# Patient Record
Sex: MALE | Race: WHITE | NOT HISPANIC OR LATINO
[De-identification: names, ages, dates, MRNs, and addresses within clinical notes are randomized per-mention and may not be internally consistent; named-entity substitution may affect disease eponyms.]

---

## 2018-03-28 ENCOUNTER — APPOINTMENT (OUTPATIENT)
Dept: ORTHOPEDIC SURGERY | Facility: CLINIC | Age: 83
End: 2018-03-28

## 2018-04-11 ENCOUNTER — APPOINTMENT (OUTPATIENT)
Dept: ORTHOPEDIC SURGERY | Facility: CLINIC | Age: 83
End: 2018-04-11

## 2021-09-10 ENCOUNTER — EMERGENCY (EMERGENCY)
Facility: HOSPITAL | Age: 86
LOS: 1 days | Discharge: HOME | End: 2021-09-10
Attending: EMERGENCY MEDICINE | Admitting: EMERGENCY MEDICINE
Payer: MEDICARE

## 2021-09-10 VITALS
RESPIRATION RATE: 18 BRPM | OXYGEN SATURATION: 97 % | SYSTOLIC BLOOD PRESSURE: 153 MMHG | DIASTOLIC BLOOD PRESSURE: 72 MMHG | TEMPERATURE: 97 F | HEART RATE: 89 BPM

## 2021-09-10 VITALS
HEART RATE: 80 BPM | DIASTOLIC BLOOD PRESSURE: 88 MMHG | TEMPERATURE: 98 F | SYSTOLIC BLOOD PRESSURE: 147 MMHG | RESPIRATION RATE: 16 BRPM

## 2021-09-10 LAB
APPEARANCE UR: CLEAR — SIGNIFICANT CHANGE UP
BILIRUB UR-MCNC: NEGATIVE — SIGNIFICANT CHANGE UP
COLOR SPEC: SIGNIFICANT CHANGE UP
DIFF PNL FLD: ABNORMAL
GLUCOSE UR QL: NEGATIVE — SIGNIFICANT CHANGE UP
KETONES UR-MCNC: NEGATIVE — SIGNIFICANT CHANGE UP
LEUKOCYTE ESTERASE UR-ACNC: NEGATIVE — SIGNIFICANT CHANGE UP
NITRITE UR-MCNC: NEGATIVE — SIGNIFICANT CHANGE UP
PH UR: 6.5 — SIGNIFICANT CHANGE UP (ref 5–8)
PROT UR-MCNC: ABNORMAL
SP GR SPEC: 1.01 — SIGNIFICANT CHANGE UP (ref 1–1.05)
UROBILINOGEN FLD QL: SIGNIFICANT CHANGE UP

## 2021-09-10 PROCEDURE — 99284 EMERGENCY DEPT VISIT MOD MDM: CPT

## 2021-09-10 NOTE — ED PROVIDER NOTE - PHYSICAL EXAMINATION
PHYSICAL EXAM:  GENERAL: Sitting comfortable in bed, in no acute distress  HENMT: Atraumatic, moist mucous membranes  EYES: Clear bilaterally, PERRL, EOMs intact b/l  HEART: RRR, S1/S2, no murmurs  RESPIRATORY: Clear to auscultation bilaterally, no wheezes/rhonchi/rales  ABDOMEN: suprapubic tenderness, +BS, soft, nondistended  EXTREMITIES: No lower extremity edema, +2 radial pulses b/l  NEURO:  A&Ox4, no focal motor deficits or sensory deficits   SKIN:  Skin normal color for race, warm, dry and intact. No evidence of rash.

## 2021-09-10 NOTE — ED PROVIDER NOTE - CLINICAL SUMMARY MEDICAL DECISION MAKING FREE TEXT BOX
87 y/o M PMH HTN, HLD, BPH s/p trasurethral prostate ablation procedure (Dr. Zuniga) yesterday at 10am presenting with urinary retention x5 hours. No infectious symptoms. Vitals stable. Will place pena, send UA/culture and reassess.

## 2021-09-10 NOTE — ED PROVIDER NOTE - OBJECTIVE STATEMENT
89 y/o M PMH HTN, HLD, BPH s/p trasurethral prostate ablation procedure (Dr. Zuniga) yesterday at 10am presenting with urinary retention. Pt states after the procedure he was able to urinate small amounts with some difficulty, however today urination became increasingly difficult to the point he has been unable to urinate for the past 5 hours and has developed significant suprapubic discomfort. He denies any fevers/chills, chest pain, SOB, n/v/d, lightheadedness/fainting or other complaints at this time. 87 y/o M PMH HTN, HLD, BPH s/p transurethral prostate ablation procedure (Dr. Zuniga) yesterday at 10am presenting with urinary retention. Pt states after the procedure he was able to urinate small amounts with some difficulty, however today urination became increasingly difficult to the point he has been unable to urinate for the past 5 hours and has developed significant suprapubic discomfort. He denies any fevers/chills, chest pain, SOB, n/v/d, lightheadedness/fainting or other complaints at this time.

## 2021-09-10 NOTE — ED ADULT NURSE NOTE - OBJECTIVE STATEMENT
Pt arrives to rm 12, appears very uncomfortable , restless. Pt st" I had a procedure yesterday ...a transurethral thermal therapy for enlarged prostate...I got home around 12noon.  and I am not able to urinate. ...they gave me cathers to try to self catherize I tried but nothing came out." Resident MD Saadia Olsen at bedside. Graves cath #16 placed without difficulty, clear yellow urine out. Ua c&s sent. Pt immediately expressing relief.

## 2021-09-10 NOTE — ED PROVIDER NOTE - NS ED ROS FT
Constitutional: no fever and no chills  Eyes: no discharge, no irritation, no pain, no visual changes  ENMT: no dysphagia or throat pain  Neck: no pain, no stiffness, no swollen glands  CV: no chest pain, no palpitations, no edema  Resp: no cough, no shortness of breath  Abd: (+) suprapubic pain, no nausea or vomiting, no diarrhea  : (+) urinary retention, no dysuria, (+) hematuria after procedure  MSK: no back pain, no neck pain, no joint pain  Neuro: no LOC, no gait abnormality, no headache, no sensory deficits, no weakness  Skin: no rashes, no lacerations, no lesions

## 2021-09-10 NOTE — ED PROVIDER NOTE - NSFOLLOWUPINSTRUCTIONS_ED_ALL_ED_FT
You were seen in the ER for urine retention after your prostate procedure. We placed a pena catheter here to empty your bladder which relieved your urine obstruction. We tested your urine which showed no sign of infection. You will be discharged home and should follow-up with your urologist Dr. Zuniga as soon as possible to discuss your ER visit and for further evaluation and management including eventual pena removal. Seek urgent medical attention or return to the ER for any new or worsening fevers/chills, abdominal pain, chest pain, difficulty breathing, vomiting/inability to eat, decreased urine output from your catheter, bleeding, or any other new or concerning symptoms. No

## 2021-09-10 NOTE — ED PROVIDER NOTE - PROGRESS NOTE DETAILS
Pretty EM/IM PGY4: Graves placed w/ 350cc urine output. UA negative for infection. Pt feels significantly better, suprapubic pressure resolved. Plan to d/c with close outpt f/u with urologist Dr. Zuniga and strict return precautions. Pt verbalized understanding of and agrees with these results and plan.

## 2021-09-10 NOTE — ED PROVIDER NOTE - CARE PROVIDER_API CALL
Shravan Zuniga  UROLOGY  [address] [place] [address]  Phone: [phone]  Fax: [phone]  Established Patient  Follow Up Time: 1-3 Days

## 2021-09-10 NOTE — ED PROVIDER NOTE - PATIENT PORTAL LINK FT
You can access the FollowMyHealth Patient Portal offered by [facility] by registering at the following website: [email or web address]. By joining [facility]’s FollowMyHealth portal, you will also be able to view your health information using other applications (apps) compatible with our system.

## 2021-09-10 NOTE — ED PROVIDER NOTE - ATTENDING CONTRIBUTION TO CARE
HPI: 87 y/o M PMH HTN, HLD, BPH s/p transurethral prostate ablation procedure (Dr. Zuniga) yesterday at 10am presenting with urinary retention. Pt states after the procedure he was able to urinate small amounts with some difficulty, however today urination became increasingly difficult to the point he has been unable to urinate for the past 5 hours and has developed significant suprapubic discomfort. He denies any fevers/chills, chest pain, SOB, n/v/d, lightheadedness/fainting or other complaints at this time.  EXAM: Uncomfortable appearing, abd soft, suprapubic tenderness to palpation with mass, no rebound/guarding,  otherwise normal without hernias or bleeding from urethra.   MDM: pt with BPH s/p TURP yday that presents with diff urinating now without ability to urinate since 5 hours prior to arrival. No fever, chills, N/V, or other systemic symptoms. likely secondary to procedure/spasms. Unlikely UTI but will place pena, send UA/culture and likely discharge home with leg bag and f/u with his URO. Pt amenable to plan.

## 2021-09-10 NOTE — ED ADULT NURSE NOTE - CHIEF COMPLAINT QUOTE
Pt had a Transurethral microwave therapy yesterday morning and c/o urinary rentention since. pt appears uncomfortable. Denies PMH

## 2021-09-11 LAB
CULTURE RESULTS: NO GROWTH — SIGNIFICANT CHANGE UP
SPECIMEN SOURCE: SIGNIFICANT CHANGE UP

## 2023-04-17 PROBLEM — I10 ESSENTIAL (PRIMARY) HYPERTENSION: Chronic | Status: ACTIVE | Noted: 2021-09-10

## 2023-04-17 PROBLEM — N40.0 BENIGN PROSTATIC HYPERPLASIA WITHOUT LOWER URINARY TRACT SYMPTOMS: Chronic | Status: ACTIVE | Noted: 2021-09-10

## 2023-04-17 PROBLEM — E78.5 HYPERLIPIDEMIA, UNSPECIFIED: Chronic | Status: ACTIVE | Noted: 2021-09-10

## 2023-05-05 ENCOUNTER — TRANSCRIPTION ENCOUNTER (OUTPATIENT)
Age: 88
End: 2023-05-05

## 2023-05-05 ENCOUNTER — APPOINTMENT (OUTPATIENT)
Dept: SURGICAL ONCOLOGY | Facility: CLINIC | Age: 88
End: 2023-05-05
Payer: MEDICARE

## 2023-05-05 VITALS
HEIGHT: 67 IN | HEART RATE: 75 BPM | WEIGHT: 175 LBS | SYSTOLIC BLOOD PRESSURE: 165 MMHG | OXYGEN SATURATION: 93 % | RESPIRATION RATE: 16 BRPM | BODY MASS INDEX: 27.47 KG/M2 | DIASTOLIC BLOOD PRESSURE: 77 MMHG

## 2023-05-05 PROCEDURE — 99205 OFFICE O/P NEW HI 60 MIN: CPT

## 2023-05-13 ENCOUNTER — RESULT REVIEW (OUTPATIENT)
Age: 88
End: 2023-05-13

## 2023-05-13 ENCOUNTER — OUTPATIENT (OUTPATIENT)
Dept: OUTPATIENT SERVICES | Facility: HOSPITAL | Age: 88
LOS: 1 days | End: 2023-05-13
Payer: MEDICARE

## 2023-05-13 DIAGNOSIS — D03.59 MELANOMA IN SITU OF OTHER PART OF TRUNK: ICD-10-CM

## 2023-05-13 PROCEDURE — 88321 CONSLTJ&REPRT SLD PREP ELSWR: CPT

## 2023-05-16 LAB — SURGICAL PATHOLOGY STUDY: SIGNIFICANT CHANGE UP

## 2023-05-19 ENCOUNTER — OUTPATIENT (OUTPATIENT)
Dept: OUTPATIENT SERVICES | Facility: HOSPITAL | Age: 88
LOS: 1 days | End: 2023-05-19
Payer: MEDICARE

## 2023-05-19 VITALS
OXYGEN SATURATION: 95 % | WEIGHT: 175.05 LBS | SYSTOLIC BLOOD PRESSURE: 150 MMHG | HEART RATE: 67 BPM | TEMPERATURE: 98 F | HEIGHT: 64.5 IN | RESPIRATION RATE: 16 BRPM | DIASTOLIC BLOOD PRESSURE: 60 MMHG

## 2023-05-19 DIAGNOSIS — D03.59 MELANOMA IN SITU OF OTHER PART OF TRUNK: ICD-10-CM

## 2023-05-19 DIAGNOSIS — I10 ESSENTIAL (PRIMARY) HYPERTENSION: ICD-10-CM

## 2023-05-19 DIAGNOSIS — E78.5 HYPERLIPIDEMIA, UNSPECIFIED: ICD-10-CM

## 2023-05-19 DIAGNOSIS — N40.0 BENIGN PROSTATIC HYPERPLASIA WITHOUT LOWER URINARY TRACT SYMPTOMS: ICD-10-CM

## 2023-05-19 LAB
ALBUMIN SERPL ELPH-MCNC: 4.5 G/DL — SIGNIFICANT CHANGE UP (ref 3.3–5)
ALP SERPL-CCNC: 67 U/L — SIGNIFICANT CHANGE UP (ref 40–120)
ALT FLD-CCNC: 23 U/L — SIGNIFICANT CHANGE UP (ref 4–41)
ANION GAP SERPL CALC-SCNC: 11 MMOL/L — SIGNIFICANT CHANGE UP (ref 7–14)
AST SERPL-CCNC: 27 U/L — SIGNIFICANT CHANGE UP (ref 4–40)
BILIRUB SERPL-MCNC: 0.7 MG/DL — SIGNIFICANT CHANGE UP (ref 0.2–1.2)
BLD GP AB SCN SERPL QL: NEGATIVE — SIGNIFICANT CHANGE UP
BUN SERPL-MCNC: 12 MG/DL — SIGNIFICANT CHANGE UP (ref 7–23)
CALCIUM SERPL-MCNC: 9.8 MG/DL — SIGNIFICANT CHANGE UP (ref 8.4–10.5)
CHLORIDE SERPL-SCNC: 102 MMOL/L — SIGNIFICANT CHANGE UP (ref 98–107)
CO2 SERPL-SCNC: 28 MMOL/L — SIGNIFICANT CHANGE UP (ref 22–31)
CREAT SERPL-MCNC: 0.89 MG/DL — SIGNIFICANT CHANGE UP (ref 0.5–1.3)
EGFR: 81 ML/MIN/1.73M2 — SIGNIFICANT CHANGE UP
GLUCOSE SERPL-MCNC: 124 MG/DL — HIGH (ref 70–99)
HCT VFR BLD CALC: 44.9 % — SIGNIFICANT CHANGE UP (ref 39–50)
HGB BLD-MCNC: 14.7 G/DL — SIGNIFICANT CHANGE UP (ref 13–17)
MCHC RBC-ENTMCNC: 30.8 PG — SIGNIFICANT CHANGE UP (ref 27–34)
MCHC RBC-ENTMCNC: 32.7 GM/DL — SIGNIFICANT CHANGE UP (ref 32–36)
MCV RBC AUTO: 94.1 FL — SIGNIFICANT CHANGE UP (ref 80–100)
NRBC # BLD: 0 /100 WBCS — SIGNIFICANT CHANGE UP (ref 0–0)
NRBC # FLD: 0 K/UL — SIGNIFICANT CHANGE UP (ref 0–0)
PLATELET # BLD AUTO: 224 K/UL — SIGNIFICANT CHANGE UP (ref 150–400)
POTASSIUM SERPL-MCNC: 4.3 MMOL/L — SIGNIFICANT CHANGE UP (ref 3.5–5.3)
POTASSIUM SERPL-SCNC: 4.3 MMOL/L — SIGNIFICANT CHANGE UP (ref 3.5–5.3)
PROT SERPL-MCNC: 7.1 G/DL — SIGNIFICANT CHANGE UP (ref 6–8.3)
RBC # BLD: 4.77 M/UL — SIGNIFICANT CHANGE UP (ref 4.2–5.8)
RBC # FLD: 13.1 % — SIGNIFICANT CHANGE UP (ref 10.3–14.5)
RH IG SCN BLD-IMP: NEGATIVE — SIGNIFICANT CHANGE UP
SODIUM SERPL-SCNC: 141 MMOL/L — SIGNIFICANT CHANGE UP (ref 135–145)
WBC # BLD: 8.58 K/UL — SIGNIFICANT CHANGE UP (ref 3.8–10.5)
WBC # FLD AUTO: 8.58 K/UL — SIGNIFICANT CHANGE UP (ref 3.8–10.5)

## 2023-05-19 PROCEDURE — 93010 ELECTROCARDIOGRAM REPORT: CPT

## 2023-05-19 NOTE — H&P PST ADULT - PROBLEM SELECTOR PLAN 1
Pt is scheduled for wide excision melanoma right chest breast on 5/25/23.  Verbal and written pre op instructions reviewed with patient and pt able to verbalize understanding.   Verbal and written instructions given with chlorhexidine wash, pt able to verbalize understanding via teach back method. Pt is scheduled for wide excision melanoma right chest breast on 5/25/23.  Verbal and written pre op instructions reviewed with patient and pt able to verbalize understanding.   Verbal and written instructions given with chlorhexidine wash, pt able to verbalize understanding via teach back method.  Pt to obtain medical eval as requested by surgeon, note requested.

## 2023-05-19 NOTE — H&P PST ADULT - ATTENDING COMMENTS
89-year-old man with a right chest melanoma in situ scheduled for excision, with primary closure    oncologic diagnosis, surgical approach, risks, benefits and possible surgical outcomes reviewed in detail, all questions answered.    consent on chart

## 2023-05-19 NOTE — H&P PST ADULT - PROBLEM SELECTOR PLAN 4
Pt instructed to take finasteride and tamsulosin AM of surgery with a sip of water, pt able to verbalize understanding.

## 2023-05-19 NOTE — H&P PST ADULT - NSANTHOSAYNRD_GEN_A_CORE
No. ART screening performed.  STOP BANG Legend: 0-2 = LOW Risk; 3-4 = INTERMEDIATE Risk; 5-8 = HIGH Risk

## 2023-05-19 NOTE — H&P PST ADULT - NSICDXPASTMEDICALHX_GEN_ALL_CORE_FT
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

## 2023-05-19 NOTE — H&P PST ADULT - NSICDXFAMILYHX_GEN_ALL_CORE_FT
FAMILY HISTORY:  Father  Still living? No  FH: heart disease, Age at diagnosis: Age Unknown    Mother  Still living? No  Family history of CVA, Age at diagnosis: Age Unknown

## 2023-05-19 NOTE — H&P PST ADULT - HISTORY OF PRESENT ILLNESS
89 yo male with preop dx. melanoma in situ of other part of trunk presents to pre surgical testing. Pt reports he was seen by derm for itching of the scalp, upon exam right chest lesion was discovered, biopsy taken revealing melanoma in situ.  Pt is scheduled for wide excision melanoma right chest breast.

## 2023-05-19 NOTE — H&P PST ADULT - PROBLEM SELECTOR PLAN 2
Pt instructed to take ramipril AM of surgery with a sip of water, pt able to verbalize understanding.

## 2023-05-19 NOTE — H&P PST ADULT - ENMT COMMENTS
Denies loose teeth or dentures. Pt has dental caps. Mallampati Denies loose teeth or dentures. Pt has dental caps. Mallampati II

## 2023-05-24 ENCOUNTER — TRANSCRIPTION ENCOUNTER (OUTPATIENT)
Age: 88
End: 2023-05-24

## 2023-05-25 ENCOUNTER — TRANSCRIPTION ENCOUNTER (OUTPATIENT)
Age: 88
End: 2023-05-25

## 2023-05-25 ENCOUNTER — RESULT REVIEW (OUTPATIENT)
Age: 88
End: 2023-05-25

## 2023-05-25 ENCOUNTER — APPOINTMENT (OUTPATIENT)
Dept: SURGICAL ONCOLOGY | Facility: AMBULATORY SURGERY CENTER | Age: 88
End: 2023-05-25

## 2023-05-25 ENCOUNTER — OUTPATIENT (OUTPATIENT)
Dept: OUTPATIENT SERVICES | Facility: HOSPITAL | Age: 88
LOS: 1 days | Discharge: HOME | End: 2023-05-25
Payer: MEDICARE

## 2023-05-25 VITALS
SYSTOLIC BLOOD PRESSURE: 118 MMHG | OXYGEN SATURATION: 97 % | HEART RATE: 68 BPM | TEMPERATURE: 97 F | DIASTOLIC BLOOD PRESSURE: 61 MMHG

## 2023-05-25 VITALS
WEIGHT: 175.05 LBS | TEMPERATURE: 99 F | SYSTOLIC BLOOD PRESSURE: 134 MMHG | RESPIRATION RATE: 18 BRPM | HEIGHT: 64 IN | OXYGEN SATURATION: 97 % | HEART RATE: 64 BPM | DIASTOLIC BLOOD PRESSURE: 84 MMHG

## 2023-05-25 DIAGNOSIS — D03.59 MELANOMA IN SITU OF OTHER PART OF TRUNK: ICD-10-CM

## 2023-05-25 PROCEDURE — 11606 EXC TR-EXT MAL+MARG >4 CM: CPT

## 2023-05-25 PROCEDURE — 88305 TISSUE EXAM BY PATHOLOGIST: CPT | Mod: 26

## 2023-05-25 PROCEDURE — 88341 IMHCHEM/IMCYTCHM EA ADD ANTB: CPT | Mod: 26

## 2023-05-25 PROCEDURE — 88342 IMHCHEM/IMCYTCHM 1ST ANTB: CPT | Mod: 26

## 2023-05-25 RX ORDER — ATORVASTATIN CALCIUM 80 MG/1
1 TABLET, FILM COATED ORAL
Refills: 0 | DISCHARGE

## 2023-05-25 RX ORDER — TAMSULOSIN HYDROCHLORIDE 0.4 MG/1
1 CAPSULE ORAL
Refills: 0 | DISCHARGE

## 2023-05-25 RX ORDER — RAMIPRIL 5 MG
1 CAPSULE ORAL
Refills: 0 | DISCHARGE

## 2023-05-25 RX ORDER — FINASTERIDE 5 MG/1
1 TABLET, FILM COATED ORAL
Refills: 0 | DISCHARGE

## 2023-05-25 NOTE — ASU DISCHARGE PLAN (ADULT/PEDIATRIC) - ASU DC SPECIAL INSTRUCTIONSFT
Maintain surgical dressing for 48 hours after getting home.    May remove tape and gauze 48 hours after getting home.    Did not remove Steri-Strips.    May shower after removing surgical gauze.    Do not need to re-dress the area.    Dr. Murray should call with the pathology report in 2 weeks, that conversation will guide followup recommendations.

## 2023-05-25 NOTE — ASU DISCHARGE PLAN (ADULT/PEDIATRIC) - CARE PROVIDER_API CALL
Jonatan Murray  Surgery  [address] [place] [id number]  Phone: [phone]  Fax: [phone]  Follow Up Time: 2 weeks

## 2023-05-25 NOTE — ASU DISCHARGE PLAN (ADULT/PEDIATRIC) - NS MD DC FALL RISK RISK
For information on Fall & Injury Prevention, visit: [email or web address] OR  [email or web address] OR  https://www.cdc.gov/steadi/patient.html

## 2023-05-25 NOTE — ASU DISCHARGE PLAN (ADULT/PEDIATRIC) - NURSING INSTRUCTIONS
Progress to regular diet as tolerated.  Keep well hydrated.   Next dose of Tylenol may be taken at 7pm

## 2023-05-25 NOTE — ASU PREOPERATIVE ASSESSMENT, ADULT ([facility] ONLY) - FALL HARM RISK - HARM RISK INTERVENTIONS

## 2023-05-25 NOTE — BRIEF OPERATIVE NOTE - OPERATION/FINDINGS
Excision of melanoma in situ from the right chest with adjacent tissue transfer for primary closure Excision of melanoma in situ from the right chest with adjacent tissue transfer for primary closure. Closed with absorbables.

## 2023-05-25 NOTE — ASU PREOPERATIVE ASSESSMENT, ADULT ([facility] ONLY) - PRO INTERPRETER NEED 2
-- English

## 2023-05-31 PROBLEM — D03.59 MELANOMA IN SITU OF OTHER PART OF TRUNK: Chronic | Status: ACTIVE | Noted: 2023-05-19

## 2023-06-07 LAB — SURGICAL PATHOLOGY STUDY: SIGNIFICANT CHANGE UP

## 2023-06-12 NOTE — REVIEW OF SYSTEMS
[Negative] : Heme/Lymph [FreeTextEntry5] : History of hypercholesterolemia [de-identified] : Melanoma of the chest [FreeTextEntry6] : History of asthma

## 2023-06-12 NOTE — ASSESSMENT
[FreeTextEntry1] : 89-year-old man.\par \par Recently diagnosed melanoma in situ of the right chest.\par \par Option of surgical excision, as an outpatient surgical procedure reviewed.\par \par Oncologic concerns, surgical technique, risk, benefits, alternatives, possible surgical outcomes reviewed.\par There is enough skin laxity to allow for primary closure with adjacent flaps.\par \par Reviewed in detail, all questions answered.\par \par He understands and would like to proceed with excision.\par Paperwork for scheduling submitted\par \par Note dictated to his referring physician\par \par \par 06/12/2023.\par I called him but had to leave a voicemail.\par May 25, 2023, he had wide excision of melanoma in situ from the right chest.\par Primary closure with adjacent tissue transfer.\par Surgical pathology demonstrated residual melanoma in situ.\par No invasive component, negative margins.\par \par Note dictated to his referring physicians

## 2023-06-12 NOTE — HISTORY OF PRESENT ILLNESS
[de-identified] : 89-year-old man.\par \par Referred by dermatology (Dr. Jed TOBIAS).\par \par Recently diagnosed melanoma in situ of his RIGHT CHEST (mid lower).\par \par This is an asymptomatic pigmented lesion that his dermatologist identified on his skin examination being done for a chief complaint of a rash on his scalp.\par The most recent visit before then was ~2 years prior.\par \par \par No previous personal history of skin cancer.\par \par No prior personal history of malignancy.\par \par \par No relatives with skin cancer.\par \par No family history of malignancy.\par \par \par Derm: Dr. Jed TOBIAS.\par \par \par PMD: Dr. Morris HOOD.\par \par NKDA.\par \par No pacemaker or defibrillator.\par No anticoagulants.\par \par + Hypertension.\par He takes ramipril.\par \par + Hypercholesterolemia.\par Treated with Lipitor\par \par He does not see a cardiologist.\par \par + BPH.\par He takes finasteride and tamsulosin.\par Urology: Dr. Nitin VILLEGAS.\par \par \par March 2023 eye examination was unremarkable.\par He does not recall any further details.\par \par \par Last colonoscopy was at age 70.\par He is asymptomatic.\par No personal or family history of colorectal disease.\par No specific follow-up recommended, for planned

## 2023-06-12 NOTE — REASON FOR VISIT
[Initial Consultation] : an initial consultation for [Other: _____] : [unfilled] [FreeTextEntry2] : Right chest melanoma in situ

## 2023-06-12 NOTE — PHYSICAL EXAM
[Normal] : supple, no neck mass and thyroid not enlarged [Normal Neck Lymph Nodes] : normal neck lymph nodes  [Normal Supraclavicular Lymph Nodes] : normal supraclavicular lymph nodes [Normal Axillary Lymph Nodes] : normal axillary lymph nodes [Normal] : full range of motion and no deformities appreciated [de-identified] : Groins not examined [de-identified] : Below

## 2023-06-14 PROBLEM — D03.59 MALIGNANT MELANOMA IN SITU OF SKIN OF ANTERIOR CHEST: Status: ACTIVE | Noted: 2023-05-04

## 2023-06-14 NOTE — PHYSICAL EXAM
[Normal] : supple, no neck mass and thyroid not enlarged [Normal Neck Lymph Nodes] : normal neck lymph nodes  [Normal Supraclavicular Lymph Nodes] : normal supraclavicular lymph nodes [Normal Axillary Lymph Nodes] : normal axillary lymph nodes [Normal] : full range of motion and no deformities appreciated [de-identified] : Groins not examined [de-identified] : Below

## 2023-06-14 NOTE — ASSESSMENT
[FreeTextEntry1] : 89-year-old man.\par \par May 2023:\par Wide excision melanoma in situ of the right chest with negative margins.\par \par Recuperating well.\par \par Presently, no further intervention is warranted.\par \par I have asked to see him yearly, and advised him to see his dermatologist regularly.\par \par He will be seen sooner if needed.

## 2023-06-14 NOTE — REVIEW OF SYSTEMS
[Negative] : Heme/Lymph [FreeTextEntry5] : Hypertension [FreeTextEntry9] : BPH [de-identified] : Melanoma

## 2023-06-14 NOTE — HISTORY OF PRESENT ILLNESS
[de-identified] : 89-year-old man.\par \par First postoperative visit.\par \par 05/25/2023:\par Wide excision melanoma in situ of the RIGHT CHEST.\par Primary closure with adjacent tissue transfer.\par Surgical pathology:\par + Residual melanoma in situ.\par No invasive component.\par + Scar from previous biopsy.\par Negative margins.\par \par May 2023:\par He was referred by dermatology (Dr. Jed TOBIAS).\par \par Recently diagnosed melanoma in situ of his RIGHT CHEST (mid lower).\par \par This is an asymptomatic pigmented lesion that his dermatologist identified on his skin examination being done for a chief complaint of a rash on his scalp.\par The most recent visit before then was ~2 years prior.\par \par \par No previous personal history of skin cancer.\par \par No prior personal history of malignancy.\par \par \par No relatives with skin cancer.\par \par No family history of malignancy.\par \par \par Derm: Dr. Jed TOBIAS.\par \par \par PMD: Dr. Morris HOOD.\par \par NKDA.\par \par No pacemaker or defibrillator.\par No anticoagulants.\par \par + Hypertension.\par He takes ramipril.\par \par + Hypercholesterolemia.\par Treated with Lipitor\par \par He does not see a cardiologist.\par \par + BPH.\par He takes finasteride and tamsulosin.\par Urology: Dr. Nitin VILLEGAS.\par \par \par March 2023 eye examination was unremarkable.\par He does not recall any further details.\par \par \par Last colonoscopy was at age 70.\par He is asymptomatic.\par No personal or family history of colorectal disease.\par No specific follow-up recommended, for planned

## 2023-06-16 ENCOUNTER — APPOINTMENT (OUTPATIENT)
Dept: SURGICAL ONCOLOGY | Facility: CLINIC | Age: 88
End: 2023-06-16

## 2023-06-16 DIAGNOSIS — D03.59 MELANOMA IN SITU OF OTHER PART OF TRUNK: ICD-10-CM

## 2024-05-22 ENCOUNTER — APPOINTMENT (OUTPATIENT)
Dept: ORTHOPEDIC SURGERY | Facility: CLINIC | Age: 89
End: 2024-05-22

## 2024-05-24 ENCOUNTER — APPOINTMENT (OUTPATIENT)
Dept: ORTHOPEDIC SURGERY | Facility: CLINIC | Age: 89
End: 2024-05-24
Payer: MEDICARE

## 2024-05-24 VITALS
SYSTOLIC BLOOD PRESSURE: 138 MMHG | HEART RATE: 76 BPM | DIASTOLIC BLOOD PRESSURE: 76 MMHG | BODY MASS INDEX: 28.25 KG/M2 | WEIGHT: 180 LBS | TEMPERATURE: 97.4 F | OXYGEN SATURATION: 94 % | HEIGHT: 67 IN

## 2024-05-24 DIAGNOSIS — S82.025A NONDISPLACED LONGITUDINAL FRACTURE OF LEFT PATELLA, INITIAL ENCOUNTER FOR CLOSED FRACTURE: ICD-10-CM

## 2024-05-24 DIAGNOSIS — M25.562 PAIN IN LEFT KNEE: ICD-10-CM

## 2024-05-24 DIAGNOSIS — M17.12 UNILATERAL PRIMARY OSTEOARTHRITIS, LEFT KNEE: ICD-10-CM

## 2024-05-24 PROCEDURE — 73562 X-RAY EXAM OF KNEE 3: CPT | Mod: LT

## 2024-05-24 PROCEDURE — 99213 OFFICE O/P EST LOW 20 MIN: CPT

## 2024-05-24 PROCEDURE — 99203 OFFICE O/P NEW LOW 30 MIN: CPT

## 2024-06-07 NOTE — ASU PREOP CHECKLIST - HEART RATE (BEATS/MIN)
At the end of the stream he has some pain and pressure that started 1 week ago. He does have increase urgency and frequency at times.     The stream is good and empties the bladder. Orders placed for a urine and faxed to [facility]   64

## (undated) DEVICE — GLV 8 PROTEXIS (WHITE)

## (undated) DEVICE — DRSG MAMMARY SUPPORT LG SIZE 4

## (undated) DEVICE — ELCTR GROUNDING PAD ADULT COVIDIEN

## (undated) DEVICE — DRSG MAMMARY SUPPORT MED SIZE 3

## (undated) DEVICE — RADIOGRAPHY DVC SPEC TRANSPEC

## (undated) DEVICE — DRAPE CHEST BREAST 106" X 122"

## (undated) DEVICE — SUT SILK 2-0 30" SH

## (undated) DEVICE — DRSG TEGADERM 4X4.75"

## (undated) DEVICE — TUBING SUCTION NONCONDUCTIVE 6MM X 12FT

## (undated) DEVICE — SOL IRR POUR NS 0.9% 500ML

## (undated) DEVICE — SUT MONOCRYL 4-0 27" PS-2 UNDYED

## (undated) DEVICE — GLV 7.5 PROTEXIS (WHITE)

## (undated) DEVICE — SYR LUER LOK 10CC

## (undated) DEVICE — POSITIONER PATIENT SAFETY STRAP 3X60"

## (undated) DEVICE — DRSG CURITY GAUZE SPONGE 4 X 4" 12-PLY

## (undated) DEVICE — DRSG MAMMARY SUPPORT MED SIZE 2

## (undated) DEVICE — SOL IRR POUR H2O 500ML

## (undated) DEVICE — ELCTR BOVIE TIP BLADE INSULATED 4" EDGE

## (undated) DEVICE — ELCTR ROCKER SWITCH PENCIL BLUE 10FT

## (undated) DEVICE — DRAPE TOWEL BLUE 17" X 24"

## (undated) DEVICE — DRAPE LAPAROTOMY TRANSVERSE

## (undated) DEVICE — NDL HYPO SAFE 25G X 1" (ORANGE)

## (undated) DEVICE — SUT VICRYL 2-0 27" SH UNDYED

## (undated) DEVICE — GOWN LG

## (undated) DEVICE — DRSG COMBINE 5X9"

## (undated) DEVICE — LAP PAD W RING 18 X 18"

## (undated) DEVICE — NDL HYPO REGULAR BEVEL 25G X 1.5" (BLUE)

## (undated) DEVICE — DRSG MAMMARY SUPPORT XL SIZE 5

## (undated) DEVICE — GLV 7 PROTEXIS (CREAM) NEU-THERA

## (undated) DEVICE — DRSG MAMMARY SUPPORT SM SIZE 1

## (undated) DEVICE — VENODYNE/SCD SLEEVE CALF MEDIUM

## (undated) DEVICE — POSITIONER FOAM EGG CRATE ULNAR 2PCS (PINK)

## (undated) DEVICE — WARMING BLANKET LOWER ADULT

## (undated) DEVICE — WARMING BLANKET FULL ADULT

## (undated) DEVICE — SUT VICRYL 3-0 27" SH UNDYED

## (undated) DEVICE — PACK MINOR NO DRAPE

## (undated) DEVICE — DRSG STERISTRIPS 0.5 X 4"

## (undated) DEVICE — SUT MONOCRYL 3-0 27" PS-2 UNDYED

## (undated) DEVICE — DRSG STERISTRIPS 0.25 X 3"